# Patient Record
Sex: FEMALE | Race: OTHER | HISPANIC OR LATINO
[De-identification: names, ages, dates, MRNs, and addresses within clinical notes are randomized per-mention and may not be internally consistent; named-entity substitution may affect disease eponyms.]

---

## 2020-09-11 PROBLEM — Z00.00 ENCOUNTER FOR PREVENTIVE HEALTH EXAMINATION: Status: ACTIVE | Noted: 2020-09-11

## 2020-10-12 ENCOUNTER — APPOINTMENT (OUTPATIENT)
Dept: COLORECTAL SURGERY | Facility: CLINIC | Age: 75
End: 2020-10-12

## 2021-03-15 ENCOUNTER — APPOINTMENT (OUTPATIENT)
Dept: COLORECTAL SURGERY | Facility: CLINIC | Age: 76
End: 2021-03-15
Payer: MEDICARE

## 2021-03-15 VITALS
WEIGHT: 173.25 LBS | TEMPERATURE: 98.1 F | BODY MASS INDEX: 32.71 KG/M2 | HEART RATE: 72 BPM | DIASTOLIC BLOOD PRESSURE: 72 MMHG | HEIGHT: 61 IN | SYSTOLIC BLOOD PRESSURE: 144 MMHG | OXYGEN SATURATION: 96 %

## 2021-03-15 DIAGNOSIS — K64.2 THIRD DEGREE HEMORRHOIDS: ICD-10-CM

## 2021-03-15 DIAGNOSIS — K62.5 HEMORRHAGE OF ANUS AND RECTUM: ICD-10-CM

## 2021-03-15 DIAGNOSIS — Z86.79 PERSONAL HISTORY OF OTHER DISEASES OF THE CIRCULATORY SYSTEM: ICD-10-CM

## 2021-03-15 PROCEDURE — 99204 OFFICE O/P NEW MOD 45 MIN: CPT | Mod: 25

## 2021-03-15 PROCEDURE — 99072 ADDL SUPL MATRL&STAF TM PHE: CPT

## 2021-03-15 PROCEDURE — 46600 DIAGNOSTIC ANOSCOPY SPX: CPT

## 2021-03-15 RX ORDER — LEVOTHYROXINE SODIUM 0.07 MG/1
75 TABLET ORAL
Refills: 0 | Status: ACTIVE | COMMUNITY

## 2021-03-15 RX ORDER — VALSARTAN 40 MG/1
40 TABLET, COATED ORAL
Refills: 0 | Status: ACTIVE | COMMUNITY

## 2021-03-15 NOTE — PHYSICAL EXAM
[Manually Reducible] : a manually reducible (grade III) [Skin Tags] : residual hemorrhoidal skin tags were noted [Normal] : was normal [Normal Breath Sounds] : Normal breath sounds [Normal Heart Sounds] : normal heart sounds [2+] : left 2+ [No Rash or Lesion] : No rash or lesion [Alert] : alert [Oriented to Person] : oriented to person [Oriented to Place] : oriented to place [Oriented to Time] : oriented to time [Calm] : calm [Abdomen Masses] : No abdominal masses [Abdomen Tenderness] : ~T No ~M abdominal tenderness [Excoriation] : no perianal excoriation [Fistula] : no fistulas [Wart] : no warts [Tender, Swollen] : nontender, non-swollen [Thrombosed] : that was not thrombosed [Stool Sample Taken] : no stool obtained on rectal exam [Gross Blood] : no gross blood [JVD] : no jugular venous distention  [Thyroid] : the thyroid was abnormal [Carotid Bruits] : no carotid bruits [de-identified] : well healed scar, no masses, no tenerness [de-identified] : grade 3-4 RMP noted circumferentially, radial folds evident and distal most mucosal is inflamed in 3 places.  sphincter tone is poor. [de-identified] : digital: tone low and squueze poor, no masses, with push no full thickness prolapse noted [de-identified] : anoscopy done:  grade 3 RMP in 3 areas and direct posterior small 0.7 cm bulge [de-identified] : when examined in the BR while sitting and pushing i feel no full thickness downfolding [de-identified] : NAD

## 2021-03-15 NOTE — ASSESSMENT
[FreeTextEntry1] : grade 3 RMP / hemorrhoids in 3 -4 areas. Radial folds noted and clear.  No full thickness rectal prolapse noted internally or externally.\par Sx's are bleeding and prolapse with BM's\par Doesn't bother her too much between BM's\par Would also do another colonoscopy at the same time.  \par \par Her problem is surgically correctable yet this would be an elective procedure.  Patient could live with the condition.\par Drawings given and reviewed with the patient.  \par Hemorrhoid section from the web site given and reviewed\par \par Needs medical clearance from PMD Dr. Dejan Ktaz.  HTN, obesity\par \par Used  for entire hx and intake and for full discussion post exam.  (Antonio [id number])

## 2021-03-15 NOTE — HISTORY OF PRESENT ILLNESS
[FreeTextEntry1] : (PMD  Dejan Katz MD,  [facility], [address] Telephone .)\par \par 77 yo woman with prolapse of rectal mass.  Sent by Dr. Mcfadden.  \par  used. [employer].\par \par Patient c/o prolapse of small balls x 1 year.  \par C/o incontinence and bleeding as well whe she cleans.\par Has prolapse symptoms of 2 cm mass with BM's. and when she urinates.\par \par BM q day.  No pain with BM's.  Consistency is soft or liquid.  \par \par Colonoscopy last done 4 years ago.  No personal hx of colon polyps or cancer. Negative family hx for colon cancer.\par \par \par PSH\par NATALIE 30 years ago\par corneal operation\par \par PMH\par Walks with a cane x 2 blocks \par hypothyroid\par walks with 2 blocks with cane (balance reasons)\par + HTN, no hx angina, MI\par no hx asthma, bronchitis\par No DM\par No hepatitis, PUD, gastritis\par : + UTI in past (not frequently and is asymptomatic now), stones, no hematuria or dysuria\par neuro: no CVA or seizure, no TIA\par no hx of bleeding issues or of healing problems\par \par Meds\par valsartan\par another anti-HTN  (? type)\par travantaina\par levothyroxine\par \par NKDA\par \par Not a smoker.  \par No ETOH.\par synthroid\par \par \par \par \par \par A\par \par \par

## 2021-04-25 ENCOUNTER — LABORATORY RESULT (OUTPATIENT)
Age: 76
End: 2021-04-25

## 2021-04-26 ENCOUNTER — TRANSCRIPTION ENCOUNTER (OUTPATIENT)
Age: 76
End: 2021-04-26

## 2021-04-26 VITALS
HEART RATE: 78 BPM | SYSTOLIC BLOOD PRESSURE: 113 MMHG | TEMPERATURE: 99 F | HEIGHT: 66 IN | OXYGEN SATURATION: 95 % | WEIGHT: 172.4 LBS | RESPIRATION RATE: 16 BRPM | DIASTOLIC BLOOD PRESSURE: 73 MMHG

## 2021-04-26 RX ORDER — VALSARTAN 80 MG/1
1 TABLET ORAL
Qty: 0 | Refills: 0 | DISCHARGE

## 2021-04-26 RX ORDER — LEVOTHYROXINE SODIUM 125 MCG
1 TABLET ORAL
Qty: 0 | Refills: 0 | DISCHARGE

## 2021-04-26 NOTE — ASU PREOP CHECKLIST - SELECT TESTS ORDERED
BMP/CBC/Urinalysis/EKG/CXR BMP/CBC/PT/PTT/INR/Urinalysis/EKG/CXR BMP/CBC/PT/PTT/INR/Urinalysis/EKG/CXR/COVID-19

## 2021-04-27 ENCOUNTER — RESULT REVIEW (OUTPATIENT)
Age: 76
End: 2021-04-27

## 2021-04-27 ENCOUNTER — OUTPATIENT (OUTPATIENT)
Dept: OUTPATIENT SERVICES | Facility: HOSPITAL | Age: 76
LOS: 1 days | Discharge: HOME | End: 2021-04-27
Payer: MEDICARE

## 2021-04-27 ENCOUNTER — APPOINTMENT (OUTPATIENT)
Dept: COLORECTAL SURGERY | Facility: HOSPITAL | Age: 76
End: 2021-04-27

## 2021-04-27 DIAGNOSIS — Z98.890 OTHER SPECIFIED POSTPROCEDURAL STATES: ICD-10-CM

## 2021-04-27 DIAGNOSIS — Q13.4 OTHER CONGENITAL CORNEAL MALFORMATIONS: Chronic | ICD-10-CM

## 2021-04-27 DIAGNOSIS — K64.9 UNSPECIFIED HEMORRHOIDS: ICD-10-CM

## 2021-04-27 DIAGNOSIS — K62.3 RECTAL PROLAPSE: ICD-10-CM

## 2021-04-27 DIAGNOSIS — Z90.710 ACQUIRED ABSENCE OF BOTH CERVIX AND UTERUS: Chronic | ICD-10-CM

## 2021-04-27 LAB
ALBUMIN SERPL ELPH-MCNC: 4.3 G/DL — SIGNIFICANT CHANGE UP (ref 3.3–5)
ALP SERPL-CCNC: 157 U/L — HIGH (ref 40–120)
ALT FLD-CCNC: 10 U/L — SIGNIFICANT CHANGE UP (ref 10–45)
ANION GAP SERPL CALC-SCNC: 11 MMOL/L — SIGNIFICANT CHANGE UP (ref 5–17)
AST SERPL-CCNC: 15 U/L — SIGNIFICANT CHANGE UP (ref 10–40)
BILIRUB SERPL-MCNC: 0.3 MG/DL — SIGNIFICANT CHANGE UP (ref 0.2–1.2)
BUN SERPL-MCNC: 12 MG/DL — SIGNIFICANT CHANGE UP (ref 7–23)
CALCIUM SERPL-MCNC: 9.6 MG/DL — SIGNIFICANT CHANGE UP (ref 8.4–10.5)
CHLORIDE SERPL-SCNC: 95 MMOL/L — LOW (ref 96–108)
CO2 SERPL-SCNC: 29 MMOL/L — SIGNIFICANT CHANGE UP (ref 22–31)
CREAT SERPL-MCNC: 0.84 MG/DL — SIGNIFICANT CHANGE UP (ref 0.5–1.3)
GLUCOSE SERPL-MCNC: 112 MG/DL — HIGH (ref 70–99)
POTASSIUM SERPL-MCNC: 4.5 MMOL/L — SIGNIFICANT CHANGE UP (ref 3.5–5.3)
POTASSIUM SERPL-SCNC: 4.5 MMOL/L — SIGNIFICANT CHANGE UP (ref 3.5–5.3)
PROT SERPL-MCNC: 8.3 G/DL — SIGNIFICANT CHANGE UP (ref 6–8.3)
SODIUM SERPL-SCNC: 135 MMOL/L — SIGNIFICANT CHANGE UP (ref 135–145)

## 2021-04-27 PROCEDURE — 80053 COMPREHEN METABOLIC PANEL: CPT

## 2021-04-27 PROCEDURE — 46260 REMOVE IN/EX HEM GROUPS 2+: CPT

## 2021-04-27 PROCEDURE — 36415 COLL VENOUS BLD VENIPUNCTURE: CPT

## 2021-04-27 PROCEDURE — 88304 TISSUE EXAM BY PATHOLOGIST: CPT | Mod: 26

## 2021-04-27 PROCEDURE — 88304 TISSUE EXAM BY PATHOLOGIST: CPT

## 2021-04-27 PROCEDURE — 45330 DIAGNOSTIC SIGMOIDOSCOPY: CPT

## 2021-04-27 RX ORDER — IBUPROFEN 200 MG
1 TABLET ORAL
Qty: 15 | Refills: 0
Start: 2021-04-27 | End: 2021-05-01

## 2021-04-27 RX ORDER — HYDROMORPHONE HYDROCHLORIDE 2 MG/ML
0.5 INJECTION INTRAMUSCULAR; INTRAVENOUS; SUBCUTANEOUS ONCE
Refills: 0 | Status: DISCONTINUED | OUTPATIENT
Start: 2021-04-27 | End: 2021-04-27

## 2021-04-27 RX ORDER — SODIUM CHLORIDE 9 MG/ML
500 INJECTION, SOLUTION INTRAVENOUS
Refills: 0 | Status: DISCONTINUED | OUTPATIENT
Start: 2021-04-27 | End: 2021-04-28

## 2021-04-27 RX ORDER — KETOROLAC TROMETHAMINE 30 MG/ML
1 SYRINGE (ML) INJECTION
Qty: 9 | Refills: 0
Start: 2021-04-27 | End: 2021-04-29

## 2021-04-27 RX ORDER — ACETAMINOPHEN 500 MG
1 TABLET ORAL
Qty: 21 | Refills: 0
Start: 2021-04-27 | End: 2021-05-03

## 2021-04-27 RX ORDER — BUPIVACAINE 13.3 MG/ML
20 INJECTION, SUSPENSION, LIPOSOMAL INFILTRATION ONCE
Refills: 0 | Status: DISCONTINUED | OUTPATIENT
Start: 2021-04-27 | End: 2021-04-28

## 2021-04-27 RX ORDER — POLYETHYLENE GLYCOL 3350 17 G/17G
17 POWDER, FOR SOLUTION ORAL
Qty: 255 | Refills: 0
Start: 2021-04-27 | End: 2021-05-11

## 2021-04-27 RX ORDER — ONDANSETRON 8 MG/1
4 TABLET, FILM COATED ORAL ONCE
Refills: 0 | Status: DISCONTINUED | OUTPATIENT
Start: 2021-04-27 | End: 2021-04-28

## 2021-04-27 RX ORDER — KETOROLAC TROMETHAMINE 30 MG/ML
1 SYRINGE (ML) INJECTION
Qty: 6 | Refills: 0
Start: 2021-04-27

## 2021-04-27 RX ORDER — METRONIDAZOLE 500 MG
1 TABLET ORAL
Qty: 15 | Refills: 0
Start: 2021-04-27 | End: 2021-05-01

## 2021-04-27 RX ADMIN — HYDROMORPHONE HYDROCHLORIDE 0.5 MILLIGRAM(S): 2 INJECTION INTRAMUSCULAR; INTRAVENOUS; SUBCUTANEOUS at 12:10

## 2021-04-27 RX ADMIN — HYDROMORPHONE HYDROCHLORIDE 0.5 MILLIGRAM(S): 2 INJECTION INTRAMUSCULAR; INTRAVENOUS; SUBCUTANEOUS at 11:45

## 2021-04-27 NOTE — BRIEF OPERATIVE NOTE - NSICDXBRIEFPREOP_GEN_ALL_CORE_FT
PRE-OP DIAGNOSIS:  Hemorrhoids 27-Apr-2021 08:43:46 Grade 3 Johnny Sepulveda   PRE-OP DIAGNOSIS:  Hemorrhoids 27-Apr-2021 08:43:46 Grade 3 Johnny Sepulveda  Rectal mucosa prolapse 27-Apr-2021 10:53:48  Johnny Sepulveda

## 2021-04-27 NOTE — BRIEF OPERATIVE NOTE - NSICDXBRIEFPOSTOP_GEN_ALL_CORE_FT
POST-OP DIAGNOSIS:  Hemorrhoid 27-Apr-2021 08:43:58 Grade 3 Johnny Sepulveda   POST-OP DIAGNOSIS:  Hemorrhoid 27-Apr-2021 08:43:58 Grade 3 Johnny Sepulveda  Rectal mucosa prolapse 27-Apr-2021 10:53:58  Johnny Sepulveda

## 2021-04-27 NOTE — BRIEF OPERATIVE NOTE - NSICDXBRIEFPROCEDURE_GEN_ALL_CORE_FT
PROCEDURES:  Colonoscopy with hemorrhoidectomy 27-Apr-2021 08:43:34  Johnny Sepulveda   PROCEDURES:  Colonoscopy with hemorrhoidectomy 27-Apr-2021 08:43:34  Johnny Sepulveda  Excision of prolapsed rectal mucosa 27-Apr-2021 10:53:18  Johnny Sepulveda

## 2021-04-27 NOTE — BRIEF OPERATIVE NOTE - OPERATION/FINDINGS
Sedated and intubated. Patient in prone position. Sedated and intubated. Colonoscopy attempted. Large stool burdeon as patient unprepped. Went in 30 cms, no lesions identified. Patient flipped to prone trip knife position. 3 areas of rectal mucosal prolapse identified, R posterior, right anterior and left lateral. Mucosa  from sphincter muscle and circumferential closure done with 3 O vicryl.

## 2021-04-28 ENCOUNTER — TRANSCRIPTION ENCOUNTER (OUTPATIENT)
Age: 76
End: 2021-04-28

## 2021-04-28 VITALS
RESPIRATION RATE: 17 BRPM | TEMPERATURE: 98 F | OXYGEN SATURATION: 96 % | SYSTOLIC BLOOD PRESSURE: 117 MMHG | DIASTOLIC BLOOD PRESSURE: 73 MMHG | HEART RATE: 81 BPM

## 2021-04-28 NOTE — PROGRESS NOTE ADULT - ASSESSMENT
76F PMH s/f Hypothyroidism and HTN and no sig PSx presenting for resection of grade 3 hemorrhoids and rectal mucosal prolapse. S/p colonoscopy with hemorrhoidectomy, excision of prolapsed rectal mucosa 4/27.    - 23 hrs obs   - Regular diet  - Pain/Nausea control prn  - SCDs  - OOBA  - D/c home today

## 2021-04-28 NOTE — DISCHARGE NOTE NURSING/CASE MANAGEMENT/SOCIAL WORK - PATIENT PORTAL LINK FT
You can access the FollowMyHealth Patient Portal offered by [facility] by registering at the following website: [email or web address]. By joining [employer]’s FollowMyHealth portal, you will also be able to view your health information using other applications (apps) compatible with our system.

## 2021-04-28 NOTE — PROGRESS NOTE ADULT - SUBJECTIVE AND OBJECTIVE BOX
INTERVAL HPI/OVERNIGHT EVENTS: admitted for 23 obs, straight cath 500cc in PACU, passed TOV (200cc), VSS    STATUS POST:  s/p colonoscopy with hemorrhoidectomy, excision of prolapsed rectal mucosa 4/27    POST OPERATIVE DAY #: 1    SUBJECTIVE: Pt seen and examined at bedside this am by surgery team. Tolerating diet, pain well controlled. No acute complaints. Denies f/n/v/cp/sob.    MEDICATIONS  (STANDING):  lactated ringers. 500 milliLiter(s) (50 mL/Hr) IV Continuous <Continuous>    MEDICATIONS  (PRN):  ondansetron Injectable 4 milliGRAM(s) IV Push Once PRN Nausea and/or Vomiting    Vital Signs Last 24 Hrs  T(C): 37 (28 Apr 2021 05:13), Max: 37.3 (27 Apr 2021 21:50)  T(F): 98.6 (28 Apr 2021 05:13), Max: 99.1 (27 Apr 2021 21:50)  HR: 67 (28 Apr 2021 05:13) (58 - 95)  BP: 146/75 (28 Apr 2021 05:13) (105/56 - 154/79)  BP(mean): 102 (27 Apr 2021 20:10) (75 - 102)  RR: 19 (28 Apr 2021 05:13) (13 - 24)  SpO2: 97% (28 Apr 2021 05:13) (93% - 100%)    PHYSICAL EXAM:    Gen: NAD, resting comfortably in bed  C/V: NSR on cardiac monitor   Pulm: Nonlabored breathing, no respiratory distress on 2LNC   Abd: abdomen soft, NT/ND, no incisions  Extrem: WWP, no calf tenderness or edema, SCDs in place    I&O's Detail    27 Apr 2021 07:01  -  28 Apr 2021 07:00  --------------------------------------------------------  IN:    Lactated Ringers: 1000 mL    Oral Fluid: 300 mL  Total IN: 1300 mL    OUT:    Post-Void Residual per Intermittent Catheterization (mL): 500 mL    Voided (mL): 1020 mL  Total OUT: 1520 mL    Total NET: -220 mL          LABS:    04-27    135  |  95<L>  |  12  ----------------------------<  112<H>  4.5   |  29  |  0.84    Ca    9.6      27 Apr 2021 08:38    TPro  8.3  /  Alb  4.3  /  TBili  0.3  /  DBili  x   /  AST  15  /  ALT  10  /  AlkPhos  157<H>  04-27          RADIOLOGY & ADDITIONAL STUDIES:
POST-OP CHECK @ 2PM     Procedure: Colonoscopy, hemorrhoidectomy, excision of prolapses rectal mucosa    Surgeon: Dr. Orozco    S: Pt has no complaints. Endorses some dizziness/sleepiness since awakening from surgery. Denies N/V, CP, SOB, calf tenderness. Pain controlled with medication, only noting pain at the anus. Denies abdominal pain or discomfort/bloating. Tolerated 4 cups water/juice. Denies voiding per patient and nursing.     Assessed patient using .     O:  T(C): 36.1 (04-27-21 @ 11:05), Max: 36.1 (04-27-21 @ 11:05)  T(F): 97 (04-27-21 @ 11:05), Max: 97 (04-27-21 @ 11:05)  HR: 62 (04-27-21 @ 12:20) (58 - 65)  BP: 105/56 (04-27-21 @ 12:20) (105/56 - 125/55)  RR: 18 (04-27-21 @ 12:20) (15 - 20)  SpO2: 94% (04-27-21 @ 12:20) (94% - 100%)      04-27    135  |  95<L>  |  12  ----------------------------<  112<H>  4.5   |  29  |  0.84    Ca    9.6      27 Apr 2021 08:38    TPro  8.3  /  Alb  4.3  /  TBili  0.3  /  DBili  x   /  AST  15  /  ALT  10  /  AlkPhos  157<H>  04-27      Gen: NAD, resting comfortably in bed, cane at bedside  C/V: NSR on cardiac monitor   Pulm: Nonlabored breathing, no respiratory distress on 2LNC   Abd: soft, NT/ND, no incisions  Extrem: WWP, no calf edema, SCDs in place      A/P: 76yFemale s/p Colonoscopy, hemorrhoidectomy, excision of prolapses rectal mucosa      Regular diet/IVF @50  Pain/nausea control  Discharge home once voids >150cc     Plan discussed with attending and chief resident.   ____________________________________________________  Lisa Hope MD     PGY1 - Surgery 
POD 1 s/p 3 quadrant hemorrhoidectomy/excision of rectal mucosal prolapse  Required straight cath yesterday for 500 ml.  Then had not voided adequate and so was brought in to 23 hr unit.  Has voided since in good amounts.  Tolerating some po intake.  No N/V.  Pain not too bad.  Patient wants to go home.     Vital Signs Last 24 Hrs  T(C): 37 (28 Apr 2021 05:13), Max: 37.3 (27 Apr 2021 21:50)  T(F): 98.6 (28 Apr 2021 05:13), Max: 99.1 (27 Apr 2021 21:50)  HR: 67 (28 Apr 2021 05:13) (58 - 95)  BP: 146/75 (28 Apr 2021 05:13) (105/56 - 154/79)  BP(mean): 102 (27 Apr 2021 20:10) (75 - 102)  RR: 19 (28 Apr 2021 05:13) (13 - 24)  SpO2: 97% (28 Apr 2021 05:13) (93% - 100%)    wounds ok, some serosanguinous drainage - as expected    04-27    135  |  95<L>  |  12  ----------------------------<  112<H>  4.5   |  29  |  0.84    Ca    9.6      27 Apr 2021 08:38    TPro  8.3  /  Alb  4.3  /  TBili  0.3  /  DBili  x   /  AST  15  /  ALT  10  /  AlkPhos  157<H>  04-27

## 2021-04-28 NOTE — PROGRESS NOTE ADULT - ASSESSMENT
S/p hemorrhoidectomy.  Now voiding good volumes  Wounds ok  Home today  Toradol and percoset and flagyl scripts have been sent to [facility]  Sitz baths 3 x/day and after BM's  DPO f/u in 1 week  To call for problems

## 2021-04-29 PROBLEM — Z98.890 POST-OPERATIVE STATE: Status: ACTIVE | Noted: 2021-04-29

## 2021-04-29 PROBLEM — I10 ESSENTIAL (PRIMARY) HYPERTENSION: Chronic | Status: ACTIVE | Noted: 2021-04-26

## 2021-04-29 PROBLEM — E03.9 HYPOTHYROIDISM, UNSPECIFIED: Chronic | Status: ACTIVE | Noted: 2021-04-26

## 2021-04-29 PROBLEM — N39.0 URINARY TRACT INFECTION, SITE NOT SPECIFIED: Chronic | Status: ACTIVE | Noted: 2021-04-26

## 2021-04-29 RX ORDER — IBUPROFEN 600 MG/1
600 TABLET, FILM COATED ORAL EVERY 6 HOURS
Qty: 30 | Refills: 1 | Status: ACTIVE | COMMUNITY
Start: 2021-04-29 | End: 1900-01-01

## 2021-04-29 RX ORDER — METRONIDAZOLE 500 MG/1
500 TABLET ORAL
Qty: 15 | Refills: 0 | Status: ACTIVE | COMMUNITY
Start: 2021-04-29 | End: 1900-01-01

## 2021-04-30 LAB — SURGICAL PATHOLOGY STUDY: SIGNIFICANT CHANGE UP

## 2021-05-07 ENCOUNTER — APPOINTMENT (OUTPATIENT)
Dept: COLORECTAL SURGERY | Facility: CLINIC | Age: 76
End: 2021-05-07
Payer: MEDICARE

## 2021-05-07 VITALS
OXYGEN SATURATION: 96 % | HEIGHT: 61 IN | SYSTOLIC BLOOD PRESSURE: 115 MMHG | TEMPERATURE: 96.2 F | WEIGHT: 171.13 LBS | BODY MASS INDEX: 32.31 KG/M2 | DIASTOLIC BLOOD PRESSURE: 71 MMHG | HEART RATE: 68 BPM

## 2021-05-07 PROCEDURE — 99024 POSTOP FOLLOW-UP VISIT: CPT

## 2021-05-07 NOTE — PHYSICAL EXAM
[FreeTextEntry1] : PE:\par \par AOA, looks well\par Chest: CTA bilaterally\par CV: s1s2, RRR\par abdomen: soft, non tender\par external anal:  some ecchymosis,  no induration or erythema,  no open wounds,  \par sutures remain intact. no discharge

## 2021-05-07 NOTE — ASSESSMENT
[FreeTextEntry1] : s/p hemorrhoidectomy,  doing well \par for followup in 5-6 weeks with michelle,  Call sooner if any problems. \par pathology benign.

## 2021-05-07 NOTE — HISTORY OF PRESENT ILLNESS
[FreeTextEntry1] : s/p hemorrhoidectomy one week ago.  Feeling"better".\par \par No bleeding,  + bowel movemnts, soft,  large.  Taking miralax  daily.   Taking only tylenol for pain. \par voiding normally

## 2021-05-07 NOTE — REASON FOR VISIT
[Post Op: _________] : a [unfilled] post op visit [Formal Caregiver] : formal caregiver [FreeTextEntry1] : hemorrhoidectomy

## 2021-06-18 ENCOUNTER — APPOINTMENT (OUTPATIENT)
Dept: COLORECTAL SURGERY | Facility: CLINIC | Age: 76
End: 2021-06-18
Payer: MEDICARE

## 2021-06-18 VITALS
BODY MASS INDEX: 32.53 KG/M2 | DIASTOLIC BLOOD PRESSURE: 71 MMHG | OXYGEN SATURATION: 96 % | SYSTOLIC BLOOD PRESSURE: 134 MMHG | TEMPERATURE: 97.7 F | HEART RATE: 60 BPM | HEIGHT: 61 IN | WEIGHT: 172.31 LBS

## 2021-06-18 PROCEDURE — 46600 DIAGNOSTIC ANOSCOPY SPX: CPT | Mod: 79

## 2021-06-18 PROCEDURE — 99024 POSTOP FOLLOW-UP VISIT: CPT

## 2021-06-18 NOTE — PHYSICAL EXAM
[Fistula] : no fistulas [Excoriation] : no perianal excoriation [Wart] : no warts [Ulcer ___ cm] : no ulcers [Nonprolapsing] : a nonprolapsing (grade I) [Tender, Swollen] : nontender, non-swollen [Thrombosed] : that was not thrombosed [Skin Tags] : residual hemorrhoidal skin tags were noted [Normal] : was normal [None] : there was no rectal abscess [Stool Sample Taken] : no stool obtained on rectal exam [Gross Blood] : no gross blood [de-identified] : external: no open wounds, no fistula or induration [de-identified] : digital: normal tone, no masses [de-identified] : anoscopy: adult: healed scars, no bleeding, unremrkable

## 2021-06-18 NOTE — HISTORY OF PRESENT ILLNESS
[FreeTextEntry1] : 76 year old patient  s/p 3 quadrant hemorrhoidectomy and excision of rectal mucosal prolapse on 4/27 is here for 2nd post op visit.  No complaints. Denies pain or bleeding.  Happy.